# Patient Record
Sex: MALE | Race: ASIAN | Employment: FULL TIME | URBAN - METROPOLITAN AREA
[De-identification: names, ages, dates, MRNs, and addresses within clinical notes are randomized per-mention and may not be internally consistent; named-entity substitution may affect disease eponyms.]

---

## 2018-02-02 ENCOUNTER — HOSPITAL ENCOUNTER (OUTPATIENT)
Age: 33
Discharge: HOME | End: 2018-02-02
Attending: FAMILY MEDICINE
Payer: COMMERCIAL

## 2018-02-02 VITALS
HEIGHT: 66 IN | SYSTOLIC BLOOD PRESSURE: 126 MMHG | RESPIRATION RATE: 18 BRPM | OXYGEN SATURATION: 100 % | TEMPERATURE: 97 F | WEIGHT: 146 LBS | HEART RATE: 55 BPM | BODY MASS INDEX: 23.46 KG/M2 | DIASTOLIC BLOOD PRESSURE: 85 MMHG

## 2018-02-02 DIAGNOSIS — H01.001 BLEPHARITIS OF RIGHT UPPER EYELID, UNSPECIFIED TYPE: Primary | ICD-10-CM

## 2018-02-02 PROCEDURE — 99202 OFFICE O/P NEW SF 15 MIN: CPT

## 2018-02-02 RX ORDER — MONTELUKAST SODIUM 5 MG/1
5 TABLET, CHEWABLE ORAL NIGHTLY
COMMUNITY

## 2018-02-02 RX ORDER — ALBUTEROL SULFATE 90 UG/1
2 AEROSOL, METERED RESPIRATORY (INHALATION) EVERY 6 HOURS PRN
COMMUNITY

## 2018-02-02 NOTE — ED PROVIDER NOTES
Patient Seen in: [address]    History   Patient presents with:  Eyelid Swelling    Stated Complaint: rt eye    HPI    Pt complains of right eyelid swelling for 2 days .  No discharge  Associated symptoms : no visual change a 34 yo with right blepharitis. Tobrex ointment was prescribed. F/U with PCP on Monday. Go to the ER if sx worsen.       Disposition and Plan     Clinical Impression:  Blepharitis of right upper eyelid, unspecified type  (primary encounter diagnosis)    Dis

## 2018-02-02 NOTE — ED INITIAL ASSESSMENT (HPI)
Per pt since Wednesday noticed right eyelid swelling reports redness and swelling states intermittent itchiness. Denies and injury or trauma.